# Patient Record
Sex: FEMALE | Race: WHITE | NOT HISPANIC OR LATINO | Employment: FULL TIME | URBAN - METROPOLITAN AREA
[De-identification: names, ages, dates, MRNs, and addresses within clinical notes are randomized per-mention and may not be internally consistent; named-entity substitution may affect disease eponyms.]

---

## 2022-01-17 ENCOUNTER — HOSPITAL ENCOUNTER (EMERGENCY)
Facility: MEDICAL CENTER | Age: 22
End: 2022-01-17
Attending: EMERGENCY MEDICINE
Payer: COMMERCIAL

## 2022-01-17 VITALS
TEMPERATURE: 98.7 F | WEIGHT: 147 LBS | RESPIRATION RATE: 18 BRPM | DIASTOLIC BLOOD PRESSURE: 58 MMHG | OXYGEN SATURATION: 98 % | HEIGHT: 68 IN | BODY MASS INDEX: 22.28 KG/M2 | HEART RATE: 96 BPM | SYSTOLIC BLOOD PRESSURE: 108 MMHG

## 2022-01-17 DIAGNOSIS — E86.0 DEHYDRATION: ICD-10-CM

## 2022-01-17 DIAGNOSIS — J10.1 INFLUENZA A: ICD-10-CM

## 2022-01-17 DIAGNOSIS — R11.2 NAUSEA AND VOMITING, INTRACTABILITY OF VOMITING NOT SPECIFIED, UNSPECIFIED VOMITING TYPE: ICD-10-CM

## 2022-01-17 LAB — S PYO DNA SPEC NAA+PROBE: NOT DETECTED

## 2022-01-17 PROCEDURE — 700105 HCHG RX REV CODE 258: Performed by: EMERGENCY MEDICINE

## 2022-01-17 PROCEDURE — 87651 STREP A DNA AMP PROBE: CPT

## 2022-01-17 PROCEDURE — 99284 EMERGENCY DEPT VISIT MOD MDM: CPT

## 2022-01-17 PROCEDURE — 96374 THER/PROPH/DIAG INJ IV PUSH: CPT

## 2022-01-17 PROCEDURE — 700102 HCHG RX REV CODE 250 W/ 637 OVERRIDE(OP): Performed by: EMERGENCY MEDICINE

## 2022-01-17 PROCEDURE — A9270 NON-COVERED ITEM OR SERVICE: HCPCS | Performed by: EMERGENCY MEDICINE

## 2022-01-17 PROCEDURE — 700111 HCHG RX REV CODE 636 W/ 250 OVERRIDE (IP): Performed by: EMERGENCY MEDICINE

## 2022-01-17 PROCEDURE — 96375 TX/PRO/DX INJ NEW DRUG ADDON: CPT

## 2022-01-17 RX ORDER — METOCLOPRAMIDE HYDROCHLORIDE 5 MG/ML
10 INJECTION INTRAMUSCULAR; INTRAVENOUS ONCE
Status: COMPLETED | OUTPATIENT
Start: 2022-01-17 | End: 2022-01-17

## 2022-01-17 RX ORDER — DIPHENHYDRAMINE HYDROCHLORIDE 50 MG/ML
25 INJECTION INTRAMUSCULAR; INTRAVENOUS ONCE
Status: COMPLETED | OUTPATIENT
Start: 2022-01-17 | End: 2022-01-17

## 2022-01-17 RX ORDER — ONDANSETRON 4 MG/1
4 TABLET, ORALLY DISINTEGRATING ORAL EVERY 6 HOURS PRN
Qty: 10 TABLET | Refills: 0 | Status: SHIPPED | OUTPATIENT
Start: 2022-01-17

## 2022-01-17 RX ORDER — ACETAMINOPHEN 325 MG/1
650 TABLET ORAL ONCE
Status: COMPLETED | OUTPATIENT
Start: 2022-01-17 | End: 2022-01-17

## 2022-01-17 RX ORDER — SODIUM CHLORIDE, SODIUM LACTATE, POTASSIUM CHLORIDE, CALCIUM CHLORIDE 600; 310; 30; 20 MG/100ML; MG/100ML; MG/100ML; MG/100ML
1000 INJECTION, SOLUTION INTRAVENOUS ONCE
Status: COMPLETED | OUTPATIENT
Start: 2022-01-17 | End: 2022-01-17

## 2022-01-17 RX ADMIN — DIPHENHYDRAMINE HYDROCHLORIDE 25 MG: 50 INJECTION INTRAMUSCULAR; INTRAVENOUS at 16:11

## 2022-01-17 RX ADMIN — METOCLOPRAMIDE 10 MG: 5 INJECTION, SOLUTION INTRAMUSCULAR; INTRAVENOUS at 16:11

## 2022-01-17 RX ADMIN — SODIUM CHLORIDE, POTASSIUM CHLORIDE, SODIUM LACTATE AND CALCIUM CHLORIDE 1000 ML: 600; 310; 30; 20 INJECTION, SOLUTION INTRAVENOUS at 16:16

## 2022-01-17 RX ADMIN — ACETAMINOPHEN 650 MG: 325 TABLET, FILM COATED ORAL at 16:11

## 2022-01-17 NOTE — ED NOTES
Pt brought back to [facility] from triage in . Pt able to transfer self to bed, in gown, on monitor, family at bedside, call light in reach. Chart up for ERP.

## 2022-01-17 NOTE — ED PROVIDER NOTES
ED Provider Note        Primary care provider: No primary care provider on file.    I verified that the patient was wearing a mask and I was wearing appropriate PPE every time I entered the room. The patient's mask was on the patient at all times during my encounter except for a brief view of the oropharynx.      CHIEF COMPLAINT  Chief Complaint   Patient presents with   • Body Aches     Symptoms started yesterday.    • N/V       HPI  Jodee Xavier is a 22 y.o. female who presents to the Emergency Department with chief complaint of nausea vomiting body aches.  Patient is approximately 5 weeks pregnant.  She has been feeling ill for the last couple days.  She reported that she went to an outside emergency department yesterday she was tested for COVID which was negative she tested positive for influenza A.  She states that she is continue to have intermittent fevers she has had some minor nausea and she has had some body aches.  She also reports sore throat.  She has had no diarrhea no abdominal pain no vaginal bleeding or discharge no urinary complaints.  She is had no change in her sense of taste or smell.  She has no other medical issues no other acute symptoms or concerns at this time.    REVIEW OF SYSTEMS  10 systems reviewed and otherwise negative, pertinent positives and negatives listed in the history of present illness.    PAST MEDICAL HISTORY   has a past medical history of Asthma.    SURGICAL HISTORY  patient denies any surgical history    SOCIAL HISTORY  Social History     Tobacco Use   • Smoking status: Never Smoker   • Smokeless tobacco: Never Used   Vaping Use   • Vaping Use: Never used   Substance Use Topics   • Alcohol use: Not Currently   • Drug use: Not Currently      Social History     Substance and Sexual Activity   Drug Use Not Currently       FAMILY HISTORY  Non-Contributory    CURRENT MEDICATIONS  Home Medications     Reviewed by Jessie Mcclure R.N. (Registered Nurse) on 01/17/22 at 1244   "Med List Status: Not Addressed   Medication Last Dose Status        Patient Burt Taking any Medications                       ALLERGIES  No Known Allergies    PHYSICAL EXAM  VITAL SIGNS: /58   Pulse (!) 111   Temp 37.6 °C (99.6 °F) (Temporal)   Resp 18   Ht 1.727 m (5' 8\")   Wt 66.7 kg (147 lb)   SpO2 97%   BMI 22.35 kg/m²   Pulse ox interpretation: I interpret this pulse ox as normal.  Constitutional: Alert and oriented x 3, moderate distress  HEENT: Atraumatic normocephalic, pupils are equal round, extraocular movements are intact. The nares is clear, external ears are normal, mouth shows dry mucous membranes moderate posterior pharyngeal erythema and edema no exudate minimal palpable cervical lymphadenopathy  Neck: no obvious JVD or tracheal deviation  Cardiovascular: Tachycardic no murmur rub or gallop   Thorax & Lungs: No respiratory distress, no wheezes rales or rhonchi, No chest tenderness.   GI: Soft nontender nondistended positive bowel sounds, no peritoneal signs  Skin: Warm dry no obvious acute rash or lesion  Musculoskeletal: Moving all extremities with normal range strength, no acute  deformity  Neurologic: Cranial nerves III through XII are grossly intact, no sensory deficit, no cerebellar dysfunction   Psychiatric: Appropriate affect for situation at this time      DIAGNOSTIC STUDIES / PROCEDURES  LABS      Results for orders placed or performed during the hospital encounter of 01/17/22   Group A Strep by PCR    Specimen: Throat   Result Value Ref Range    Group A Strep by PCR Not Detected Not Detected       All labs reviewed by me.        COURSE & MEDICAL DECISION MAKING  Pertinent Labs & Imaging studies reviewed. (See chart for details)    3:38 PM - Patient seen and examined at bedside.       Patient noted to have slightly elevated blood pressure likely circumstantial secondary to presenting complaint. Referred to primary care physician for further evaluation.      Medical Decision " "Making: Influenza positive at outside facility yesterday continues to be symptomatic she was slightly tachycardic at arrival.  She was afebrile having some minor nausea she was given Reglan Benadryl acetaminophen and a small fluid bolus she is had complete resolution of her symptoms feeling much better requesting discharge at this time.  Her vitals are now normal her exam is reassuring she understands to return for any fevers not responsive to antipyretics any shortness of breath chest pain any other acute symptoms or concerns she is otherwise discharged in stable and improved condition.    /58   Pulse 96   Temp 37.1 °C (98.7 °F) (Temporal)   Resp 18   Ht 1.727 m (5' 8\")   Wt 66.7 kg (147 lb)   SpO2 98%   BMI 22.35 kg/m²     [facility]  [address] [facility] [address]  [phone]  Schedule an appointment as soon as possible for a visit   for establishment of primary care, for blood pressure management    [facility], Emergency Dept  [address] [facility] [address] [phone]    in 12-24 hours if symptoms persist, immediately If symptoms worsen, or if you develop any other symptoms or concerns      Discharge Medication List as of 1/17/2022  6:09 PM      START taking these medications    Details   ondansetron (ZOFRAN ODT) 4 MG TABLET DISPERSIBLE Take 1 Tablet by mouth every 6 hours as needed., Disp-10 Tablet, R-0, Print Rx Paper             FINAL IMPRESSION  1. Influenza A Active   2. Nausea and vomiting, intractability of vomiting not specified, unspecified vomiting type Active   3. Dehydration Active          This dictation has been created using voice recognition software and/or scribes. The accuracy of the dictation is limited by the abilities of the software and the expertise of the scribes. I expect there may be some errors of grammar and possibly content. I made every attempt to manually correct the errors within my " dictation. However, errors related to voice recognition software and/or scribes may still exist and should be interpreted within the appropriate context.

## 2022-01-17 NOTE — ED TRIAGE NOTES
"Chief Complaint   Patient presents with   • Body Aches     Symptoms started yesterday.    • N/V     /75   Pulse (!) 125   Temp 37.6 °C (99.6 °F) (Temporal)   Resp 18   Ht 1.727 m (5' 8\")   Wt 66.7 kg (147 lb)   SpO2 97%   BMI 22.35 kg/m²     Pt ambulatd into triage, mask in place. Seen at [place] yesterday and diagnosed with the flu. Pt is 5 weeks pregnant, no cramping or bleeding. NAD, encouraged to return to the triage nurse or tech with any new complaints or symptoms.  "

## 2022-01-18 NOTE — ED NOTES
Pt ambulated to bathroom with stand-by assist. PIV established. Medicated per MAR. Throat swab collected and sent to lab.

## 2023-03-16 ENCOUNTER — HOSPITAL ENCOUNTER (EMERGENCY)
Facility: MEDICAL CENTER | Age: 23
End: 2023-03-16
Attending: EMERGENCY MEDICINE
Payer: COMMERCIAL

## 2023-03-16 VITALS
DIASTOLIC BLOOD PRESSURE: 69 MMHG | SYSTOLIC BLOOD PRESSURE: 120 MMHG | RESPIRATION RATE: 18 BRPM | HEART RATE: 99 BPM | BODY MASS INDEX: 24.67 KG/M2 | TEMPERATURE: 97.8 F | WEIGHT: 162.26 LBS | OXYGEN SATURATION: 98 %

## 2023-03-16 DIAGNOSIS — R10.2 PELVIC PAIN: ICD-10-CM

## 2023-03-16 LAB
ALBUMIN SERPL BCP-MCNC: 4.6 G/DL (ref 3.2–4.9)
ALBUMIN/GLOB SERPL: 1.4 G/DL
ALP SERPL-CCNC: 94 U/L (ref 30–99)
ALT SERPL-CCNC: 13 U/L (ref 2–50)
ANION GAP SERPL CALC-SCNC: 13 MMOL/L (ref 7–16)
APPEARANCE UR: CLEAR
AST SERPL-CCNC: 13 U/L (ref 12–45)
BASOPHILS # BLD AUTO: 0.6 % (ref 0–1.8)
BASOPHILS # BLD: 0.04 K/UL (ref 0–0.12)
BILIRUB SERPL-MCNC: 0.3 MG/DL (ref 0.1–1.5)
BILIRUB UR QL STRIP.AUTO: NEGATIVE
BUN SERPL-MCNC: 8 MG/DL (ref 8–22)
C TRACH DNA GENITAL QL NAA+PROBE: NEGATIVE
CALCIUM ALBUM COR SERPL-MCNC: 9.1 MG/DL (ref 8.5–10.5)
CALCIUM SERPL-MCNC: 9.6 MG/DL (ref 8.5–10.5)
CANDIDA DNA VAG QL PROBE+SIG AMP: NEGATIVE
CHLORIDE SERPL-SCNC: 104 MMOL/L (ref 96–112)
CO2 SERPL-SCNC: 22 MMOL/L (ref 20–33)
COLOR UR: YELLOW
CREAT SERPL-MCNC: 0.61 MG/DL (ref 0.5–1.4)
EOSINOPHIL # BLD AUTO: 0.11 K/UL (ref 0–0.51)
EOSINOPHIL NFR BLD: 1.5 % (ref 0–6.9)
ERYTHROCYTE [DISTWIDTH] IN BLOOD BY AUTOMATED COUNT: 38.6 FL (ref 35.9–50)
G VAGINALIS DNA VAG QL PROBE+SIG AMP: NEGATIVE
GFR SERPLBLD CREATININE-BSD FMLA CKD-EPI: 129 ML/MIN/1.73 M 2
GLOBULIN SER CALC-MCNC: 3.4 G/DL (ref 1.9–3.5)
GLUCOSE SERPL-MCNC: 110 MG/DL (ref 65–99)
GLUCOSE UR STRIP.AUTO-MCNC: NEGATIVE MG/DL
HCG SERPL QL: NEGATIVE
HCT VFR BLD AUTO: 43.9 % (ref 37–47)
HGB BLD-MCNC: 14.2 G/DL (ref 12–16)
IMM GRANULOCYTES # BLD AUTO: 0.04 K/UL (ref 0–0.11)
IMM GRANULOCYTES NFR BLD AUTO: 0.6 % (ref 0–0.9)
KETONES UR STRIP.AUTO-MCNC: ABNORMAL MG/DL
LEUKOCYTE ESTERASE UR QL STRIP.AUTO: NEGATIVE
LIPASE SERPL-CCNC: 23 U/L (ref 11–82)
LYMPHOCYTES # BLD AUTO: 1.89 K/UL (ref 1–4.8)
LYMPHOCYTES NFR BLD: 26.6 % (ref 22–41)
MCH RBC QN AUTO: 27.3 PG (ref 27–33)
MCHC RBC AUTO-ENTMCNC: 32.3 G/DL (ref 33.6–35)
MCV RBC AUTO: 84.3 FL (ref 81.4–97.8)
MICRO URNS: ABNORMAL
MONOCYTES # BLD AUTO: 0.43 K/UL (ref 0–0.85)
MONOCYTES NFR BLD AUTO: 6.1 % (ref 0–13.4)
N GONORRHOEA DNA GENITAL QL NAA+PROBE: NEGATIVE
NEUTROPHILS # BLD AUTO: 4.59 K/UL (ref 2–7.15)
NEUTROPHILS NFR BLD: 64.6 % (ref 44–72)
NITRITE UR QL STRIP.AUTO: NEGATIVE
NRBC # BLD AUTO: 0 K/UL
NRBC BLD-RTO: 0 /100 WBC
PH UR STRIP.AUTO: 6.5 [PH] (ref 5–8)
PLATELET # BLD AUTO: 369 K/UL (ref 164–446)
PMV BLD AUTO: 8.9 FL (ref 9–12.9)
POTASSIUM SERPL-SCNC: 4.3 MMOL/L (ref 3.6–5.5)
PROT SERPL-MCNC: 8 G/DL (ref 6–8.2)
PROT UR QL STRIP: NEGATIVE MG/DL
RBC # BLD AUTO: 5.21 M/UL (ref 4.2–5.4)
RBC UR QL AUTO: NEGATIVE
SODIUM SERPL-SCNC: 139 MMOL/L (ref 135–145)
SP GR UR STRIP.AUTO: 1.03
SPECIMEN SOURCE: NORMAL
T VAGINALIS DNA VAG QL PROBE+SIG AMP: NEGATIVE
UROBILINOGEN UR STRIP.AUTO-MCNC: 0.2 MG/DL
WBC # BLD AUTO: 7.1 K/UL (ref 4.8–10.8)

## 2023-03-16 PROCEDURE — 83690 ASSAY OF LIPASE: CPT

## 2023-03-16 PROCEDURE — 84703 CHORIONIC GONADOTROPIN ASSAY: CPT

## 2023-03-16 PROCEDURE — 99284 EMERGENCY DEPT VISIT MOD MDM: CPT

## 2023-03-16 PROCEDURE — 87660 TRICHOMONAS VAGIN DIR PROBE: CPT

## 2023-03-16 PROCEDURE — 87591 N.GONORRHOEAE DNA AMP PROB: CPT

## 2023-03-16 PROCEDURE — 36415 COLL VENOUS BLD VENIPUNCTURE: CPT

## 2023-03-16 PROCEDURE — 87491 CHLMYD TRACH DNA AMP PROBE: CPT

## 2023-03-16 PROCEDURE — 85025 COMPLETE CBC W/AUTO DIFF WBC: CPT

## 2023-03-16 PROCEDURE — 81003 URINALYSIS AUTO W/O SCOPE: CPT

## 2023-03-16 PROCEDURE — 80053 COMPREHEN METABOLIC PANEL: CPT

## 2023-03-16 PROCEDURE — 87480 CANDIDA DNA DIR PROBE: CPT

## 2023-03-16 PROCEDURE — 87510 GARDNER VAG DNA DIR PROBE: CPT

## 2023-03-16 NOTE — ED NOTES
ED tech located pelvic gurney for exam. Pt transferred over to pelvic gurney, pelvic cart at bedside.

## 2023-03-16 NOTE — ED TRIAGE NOTES
"Pt comes in reporting every month has vaginal discharge/tissue. \"I pass a piece of a tissue\" pt adding on abd cramping and nausea/HA. Pt with pictures of what she passes. Pt reporting [facility] did send the sample to pathology without no results yet   "

## 2023-03-16 NOTE — DISCHARGE INSTRUCTIONS
Please follow-up with your GYN as previously scheduled.  If you would like to see a GYN here in [place], you may contact the clinic listed above.  Please bring the pathology report from [facility] that we gave you today to your GYN appointment.  Return to the emergency department if you develop any new or worsening symptoms including worsening pain, vaginal discharge, worsening bleeding, fevers, or if you have any further concerns.

## 2023-03-16 NOTE — ED PROVIDER NOTES
Emergency Physician Note    Chief Complaint:  Chief Complaint   Patient presents with    Vaginal Discharge    Pelvic Pain                Limitation to History:  Select: : None    HPI/ROS   Outside Historians:   None     External Records Reviewed:   I was able to obtain the medical records from her emergency department visit at [facility] on 1/15/2023.  PA note reviewed from that visit.  She presented for evaluation of waxing and waning vaginal bleeding.  Noted that she had been evaluated 2 days prior for similar symptoms.  Labs were repeated that showed no change in hemoglobin.  Pelvic exam had been obtained 2 days prior.  She was discharged home in stable condition.  I was also able to obtain records from her visit on 1/1/2023, when she brought in a piece of tissue that she had passed.  Emergency physician note reviewed from that visit.  Pelvic ultrasound was obtained at that visit, that showed retroverted uterus.  Pelvic ultrasound was unremarkable.    HPI:  Jodee Xavier is a 23 y.o. female who presents to the emergency department today for evaluation of abnormal vaginal discharge.  Her symptoms initially began 3 months ago, she states that she passed an abnormal appearing piece of tissue.  She has a photo of it from that time, that is not identifiable on the cell phone picture.  She was seen at [facility] in [place], and states that the specimen was sent for pathology.  However she has not been able to receive a report.  Ever since that time, she reports that she has been passing similar pieces of tissue monthly, most recently 6 days ago.  She also states that she began to develop some nausea, and intermittent pelvic cramping 3 months ago when she passed the first piece of tissue.  She has taken home pregnancy tests which were negative.  She states that she had an ultrasound and pelvic exam done in January at [facility], however they were not able to find  any abnormalities, nor cause of her symptoms.  She has not been able to schedule a follow-up appointment with GYN, she does have an appointment scheduled for May, however her symptoms are persistent prompting her to come to [facility] for further evaluation.  She reports no associated fevers, she is on oral contraceptives, and states that she is having some breakthrough bleeding at this time which is unusual for her.  She is otherwise not having abnormal vaginal discharge today.  She does not know of any foreign body that could have been retained in the vagina.  She does not report any other significant past medical history, no medications, no anticoagulation or antiplatelet agent use at this time.      PAST MEDICAL HISTORY  Past Medical History:   Diagnosis Date    Asthma        SURGICAL HISTORY  History reviewed. No pertinent surgical history.    FAMILY HISTORY  None reported.     SOCIAL HISTORY   reports that she has never smoked. She has never used smokeless tobacco. She reports that she does not currently use alcohol. She reports that she does not currently use drugs.    CURRENT MEDICATIONS  Previous Medications    ONDANSETRON (ZOFRAN ODT) 4 MG TABLET DISPERSIBLE    Take 1 Tablet by mouth every 6 hours as needed.       ALLERGIES  Patient has no known allergies.    PHYSICAL EXAM  Vital Signs: /84   Pulse (!) 117   Temp 36.4 °C (97.5 °F) (Temporal)   Resp 18   Wt 73.6 kg (162 lb 4.1 oz)   SpO2 95%   BMI 24.67 kg/m²   Constitutional: Alert, no acute distress  HENT: Normocephalic, atraumatic.  Neck: Supple, normal range of motion, no stridor  Cardiovascular: Tachycardic rate  Pulmonary: No respiratory distress, normal work of breathing  Abdomen: Soft,  Nontender, nondistended, no peritoneal signs, bowel sounds are present.   : Scant white discharge present, consistent with physiologic.  Cervix is normal-appearing with scant blood from the cervical os consistent with reported  history of vaginal bleeding or breakthrough bleeding, no masses appreciated, no foreign body appreciated, no cervical motion tenderness  Skin: Warm, dry, no rashes or lesions  Back: No pain with active range of motion  Musculoskeletal: Normal range of motion in all extremities, no swelling or deformity noted  Neurologic: Alert, oriented, normal motor function, no speech deficits  Psychiatric: Normal and appropriate mood and affect      Diagnostic Studies & Procedures      Labs:  All labs reviewed by me as noted below.      Course and Medical Decision Making    ED Observation Status? Yes;  Differential diagnosis includes severe or life threatening conditions including Retained foreign body, abnormal mass.  Due to diagnostic uncertainty at this time, patient placed in observation status at 11:59 AM, 3/16/2023.    Observation plan is as follows: Attempt to obtain medical records and pathology report from [facility], lab work, imaging if necessary, pelvic exam    Upon Reevaluation, the patient's condition has: Remained stable with no worsening symptoms, no concerning findings on evaluation    Patient discharged from ED Observation status at 4:05 PM, 3/16/2023  Medical complexity: Moderate      Initial Assessment and Plan  Ms. Xavier presents to the emergency department today for ongoing intermittent vaginal bleeding, as well as some pelvic pain as documented above.  She is concerned because she had been passing some abnormal appearing tissue, most recently few days ago.  Pelvic exam today does not reveal any abnormal tissue, masses, nor foreign body.  I obtain screening lab work including CBC, CMP, and urinalysis which were reassuring.  I also obtained Candida, trichomonas, Gardnerella testing by PCR, as well as gonorrhea chlamydia testing by PCR, these test all resulted negative.    I was able to obtain the pathology report from [facility], though due to font, and poor copy quality, it is not entirely legible.  It  is listed as a biopsy specimen, final diagnosis lists inactive endometrial glands and decidualized stroma, consistent with hormone effect.  No chorionic villi present.  No hyperplasia, atypia, or carcinoma identified.    She has a follow-up appointment scheduled with GYN next month, she is counseled to keep that appointment, and bring a copy of the pathology report with her to that appointment.  I did provide her with a copy in the emergency department today. Return precautions were discussed with the patient, and provided in written form with the patient's discharge instructions.       Additional Problems and Disposition    Additional problems addressed:   Follow up results for pathology testing at [facility]-results were obtained in the emergency department today and provided to the patient    Escalation of care considered, and ultimately not performed: GYN consultation, and subsequent admission were considered, however on review of pathology testing, there is no indication for emergent consultation, admission, no urgent or emergent GYN procedure.    Disposition:  Discharged home in stable condition    FINAL IMPRESSION   1. Pelvic pain        The note accurately reflects work and decisions made by me.  Pati Mckeon M.D.  3/17/2023  9:48 AM

## 2025-06-02 ENCOUNTER — HOSPITAL ENCOUNTER (OUTPATIENT)
Facility: MEDICAL CENTER | Age: 25
End: 2025-06-03
Attending: STUDENT IN AN ORGANIZED HEALTH CARE EDUCATION/TRAINING PROGRAM | Admitting: STUDENT IN AN ORGANIZED HEALTH CARE EDUCATION/TRAINING PROGRAM
Payer: COMMERCIAL

## 2025-06-02 PROBLEM — R00.0 TACHYCARDIA: Status: ACTIVE | Noted: 2025-06-02

## 2025-06-02 PROCEDURE — 770020 HCHG ROOM/CARE - TELE (206)

## 2025-06-02 PROCEDURE — 93005 ELECTROCARDIOGRAM TRACING: CPT | Mod: TC

## 2025-06-02 RX ORDER — POLYETHYLENE GLYCOL 3350 17 G/17G
1 POWDER, FOR SOLUTION ORAL
Status: DISCONTINUED | OUTPATIENT
Start: 2025-06-02 | End: 2025-06-03 | Stop reason: HOSPADM

## 2025-06-02 RX ORDER — AMOXICILLIN 250 MG
2 CAPSULE ORAL EVERY EVENING
Status: DISCONTINUED | OUTPATIENT
Start: 2025-06-03 | End: 2025-06-03 | Stop reason: HOSPADM

## 2025-06-02 RX ORDER — ENOXAPARIN SODIUM 100 MG/ML
40 INJECTION SUBCUTANEOUS DAILY
Status: DISCONTINUED | OUTPATIENT
Start: 2025-06-03 | End: 2025-06-03 | Stop reason: HOSPADM

## 2025-06-02 SDOH — ECONOMIC STABILITY: TRANSPORTATION INSECURITY
IN THE PAST 12 MONTHS, HAS LACK OF RELIABLE TRANSPORTATION KEPT YOU FROM MEDICAL APPOINTMENTS, MEETINGS, WORK OR FROM GETTING THINGS NEEDED FOR DAILY LIVING?: NO

## 2025-06-02 SDOH — ECONOMIC STABILITY: TRANSPORTATION INSECURITY
IN THE PAST 12 MONTHS, HAS THE LACK OF TRANSPORTATION KEPT YOU FROM MEDICAL APPOINTMENTS OR FROM GETTING MEDICATIONS?: NO

## 2025-06-02 ASSESSMENT — COGNITIVE AND FUNCTIONAL STATUS - GENERAL
SUGGESTED CMS G CODE MODIFIER MOBILITY: CJ
DAILY ACTIVITIY SCORE: 23
SUGGESTED CMS G CODE MODIFIER DAILY ACTIVITY: CI
WALKING IN HOSPITAL ROOM: A LITTLE
MOVING TO AND FROM BED TO CHAIR: A LITTLE
MOBILITY SCORE: 22
TOILETING: A LITTLE

## 2025-06-02 ASSESSMENT — LIFESTYLE VARIABLES
EVER HAD A DRINK FIRST THING IN THE MORNING TO STEADY YOUR NERVES TO GET RID OF A HANGOVER: NO
HOW MANY TIMES IN THE PAST YEAR HAVE YOU HAD 5 OR MORE DRINKS IN A DAY: 0
AVERAGE NUMBER OF DAYS PER WEEK YOU HAVE A DRINK CONTAINING ALCOHOL: 0
TOTAL SCORE: 0
EVER FELT BAD OR GUILTY ABOUT YOUR DRINKING: NO
ON A TYPICAL DAY WHEN YOU DRINK ALCOHOL HOW MANY DRINKS DO YOU HAVE: 0
DOES PATIENT WANT TO STOP DRINKING: NO
CONSUMPTION TOTAL: NEGATIVE
TOTAL SCORE: 0
HAVE PEOPLE ANNOYED YOU BY CRITICIZING YOUR DRINKING: NO
HAVE YOU EVER FELT YOU SHOULD CUT DOWN ON YOUR DRINKING: NO
ALCOHOL_USE: NO
TOTAL SCORE: 0

## 2025-06-02 ASSESSMENT — SOCIAL DETERMINANTS OF HEALTH (SDOH)
WITHIN THE PAST 12 MONTHS, THE FOOD YOU BOUGHT JUST DIDN'T LAST AND YOU DIDN'T HAVE MONEY TO GET MORE: NEVER TRUE
WITHIN THE LAST YEAR, HAVE YOU BEEN AFRAID OF YOUR PARTNER OR EX-PARTNER?: NO
WITHIN THE LAST YEAR, HAVE TO BEEN RAPED OR FORCED TO HAVE ANY KIND OF SEXUAL ACTIVITY BY YOUR PARTNER OR EX-PARTNER?: NO
WITHIN THE PAST 12 MONTHS, YOU WORRIED THAT YOUR FOOD WOULD RUN OUT BEFORE YOU GOT THE MONEY TO BUY MORE: NEVER TRUE
IN THE PAST 12 MONTHS, HAS THE ELECTRIC, GAS, OIL, OR WATER COMPANY THREATENED TO SHUT OFF SERVICE IN YOUR HOME?: NO
WITHIN THE LAST YEAR, HAVE YOU BEEN KICKED, HIT, SLAPPED, OR OTHERWISE PHYSICALLY HURT BY YOUR PARTNER OR EX-PARTNER?: NO
WITHIN THE LAST YEAR, HAVE YOU BEEN HUMILIATED OR EMOTIONALLY ABUSED IN OTHER WAYS BY YOUR PARTNER OR EX-PARTNER?: NO

## 2025-06-02 ASSESSMENT — PATIENT HEALTH QUESTIONNAIRE - PHQ9
2. FEELING DOWN, DEPRESSED, IRRITABLE, OR HOPELESS: NOT AT ALL
1. LITTLE INTEREST OR PLEASURE IN DOING THINGS: NOT AT ALL
SUM OF ALL RESPONSES TO PHQ9 QUESTIONS 1 AND 2: 0

## 2025-06-03 ENCOUNTER — APPOINTMENT (OUTPATIENT)
Dept: CARDIOLOGY | Facility: MEDICAL CENTER | Age: 25
End: 2025-06-03
Payer: COMMERCIAL

## 2025-06-03 VITALS
SYSTOLIC BLOOD PRESSURE: 99 MMHG | OXYGEN SATURATION: 97 % | BODY MASS INDEX: 24.17 KG/M2 | HEART RATE: 66 BPM | WEIGHT: 158.95 LBS | DIASTOLIC BLOOD PRESSURE: 60 MMHG | TEMPERATURE: 97.7 F | RESPIRATION RATE: 17 BRPM

## 2025-06-03 PROBLEM — F31.9 BIPOLAR DISORDER (HCC): Status: ACTIVE | Noted: 2025-06-03

## 2025-06-03 PROBLEM — R11.0 NAUSEA: Status: ACTIVE | Noted: 2025-06-03

## 2025-06-03 PROBLEM — F32.A DEPRESSION: Status: ACTIVE | Noted: 2025-06-03

## 2025-06-03 PROBLEM — G47.00 INSOMNIA: Status: ACTIVE | Noted: 2025-06-03

## 2025-06-03 LAB
ALBUMIN SERPL BCP-MCNC: 4.2 G/DL (ref 3.2–4.9)
ALBUMIN/GLOB SERPL: 1.7 G/DL
ALP SERPL-CCNC: 78 U/L (ref 30–99)
ALT SERPL-CCNC: 10 U/L (ref 2–50)
AMPHET UR QL SCN: POSITIVE
ANION GAP SERPL CALC-SCNC: 8 MMOL/L (ref 7–16)
AST SERPL-CCNC: 16 U/L (ref 12–45)
BARBITURATES UR QL SCN: NEGATIVE
BASOPHILS # BLD AUTO: 0.7 % (ref 0–1.8)
BASOPHILS # BLD: 0.06 K/UL (ref 0–0.12)
BENZODIAZ UR QL SCN: NEGATIVE
BILIRUB SERPL-MCNC: 0.6 MG/DL (ref 0.1–1.5)
BUN SERPL-MCNC: 8 MG/DL (ref 8–22)
BZE UR QL SCN: NEGATIVE
CALCIUM ALBUM COR SERPL-MCNC: 8.6 MG/DL (ref 8.5–10.5)
CALCIUM SERPL-MCNC: 8.8 MG/DL (ref 8.5–10.5)
CANNABINOIDS UR QL SCN: NEGATIVE
CHLORIDE SERPL-SCNC: 105 MMOL/L (ref 96–112)
CHOLEST SERPL-MCNC: 117 MG/DL (ref 100–199)
CO2 SERPL-SCNC: 24 MMOL/L (ref 20–33)
CREAT SERPL-MCNC: 0.65 MG/DL (ref 0.5–1.4)
D DIMER PPP IA.FEU-MCNC: <0.27 UG/ML (FEU) (ref 0–0.5)
EKG IMPRESSION: NORMAL
EOSINOPHIL # BLD AUTO: 0.06 K/UL (ref 0–0.51)
EOSINOPHIL NFR BLD: 0.7 % (ref 0–6.9)
ERYTHROCYTE [DISTWIDTH] IN BLOOD BY AUTOMATED COUNT: 38.5 FL (ref 35.9–50)
FENTANYL UR QL: NEGATIVE
GFR SERPLBLD CREATININE-BSD FMLA CKD-EPI: 125 ML/MIN/1.73 M 2
GLOBULIN SER CALC-MCNC: 2.5 G/DL (ref 1.9–3.5)
GLUCOSE SERPL-MCNC: 99 MG/DL (ref 65–99)
HCT VFR BLD AUTO: 37.5 % (ref 37–47)
HDLC SERPL-MCNC: 55 MG/DL
HGB BLD-MCNC: 12.9 G/DL (ref 12–16)
IMM GRANULOCYTES # BLD AUTO: 0.03 K/UL (ref 0–0.11)
IMM GRANULOCYTES NFR BLD AUTO: 0.3 % (ref 0–0.9)
LDLC SERPL CALC-MCNC: 56 MG/DL
LV EJECT FRACT  99904: 60
LV EJECT FRACT MOD 2C 99903: 51.68
LV EJECT FRACT MOD 4C 99902: 65.81
LV EJECT FRACT MOD BP 99901: 58.95
LYMPHOCYTES # BLD AUTO: 2.41 K/UL (ref 1–4.8)
LYMPHOCYTES NFR BLD: 27.7 % (ref 22–41)
MAGNESIUM SERPL-MCNC: 1.9 MG/DL (ref 1.5–2.5)
MCH RBC QN AUTO: 28.5 PG (ref 27–33)
MCHC RBC AUTO-ENTMCNC: 34.4 G/DL (ref 32.2–35.5)
MCV RBC AUTO: 83 FL (ref 81.4–97.8)
METHADONE UR QL SCN: NEGATIVE
MONOCYTES # BLD AUTO: 0.57 K/UL (ref 0–0.85)
MONOCYTES NFR BLD AUTO: 6.5 % (ref 0–13.4)
NEUTROPHILS # BLD AUTO: 5.58 K/UL (ref 1.82–7.42)
NEUTROPHILS NFR BLD: 64.1 % (ref 44–72)
NRBC # BLD AUTO: 0 K/UL
NRBC BLD-RTO: 0 /100 WBC (ref 0–0.2)
OPIATES UR QL SCN: NEGATIVE
OXYCODONE UR QL SCN: NEGATIVE
PCP UR QL SCN: NEGATIVE
PLATELET # BLD AUTO: 297 K/UL (ref 164–446)
PMV BLD AUTO: 9.5 FL (ref 9–12.9)
POTASSIUM SERPL-SCNC: 3.5 MMOL/L (ref 3.6–5.5)
PROPOXYPH UR QL SCN: NEGATIVE
PROT SERPL-MCNC: 6.7 G/DL (ref 6–8.2)
RBC # BLD AUTO: 4.52 M/UL (ref 4.2–5.4)
SODIUM SERPL-SCNC: 137 MMOL/L (ref 135–145)
TRIGL SERPL-MCNC: 30 MG/DL (ref 0–149)
TROPONIN T SERPL-MCNC: <6 NG/L (ref 6–19)
TSH SERPL DL<=0.005 MIU/L-ACNC: 3.23 UIU/ML (ref 0.38–5.33)
WBC # BLD AUTO: 8.7 K/UL (ref 4.8–10.8)

## 2025-06-03 PROCEDURE — 93306 TTE W/DOPPLER COMPLETE: CPT | Mod: 26 | Performed by: INTERNAL MEDICINE

## 2025-06-03 PROCEDURE — 85379 FIBRIN DEGRADATION QUANT: CPT

## 2025-06-03 PROCEDURE — 93306 TTE W/DOPPLER COMPLETE: CPT

## 2025-06-03 PROCEDURE — A9270 NON-COVERED ITEM OR SERVICE: HCPCS | Performed by: STUDENT IN AN ORGANIZED HEALTH CARE EDUCATION/TRAINING PROGRAM

## 2025-06-03 PROCEDURE — 85025 COMPLETE CBC W/AUTO DIFF WBC: CPT

## 2025-06-03 PROCEDURE — 84443 ASSAY THYROID STIM HORMONE: CPT

## 2025-06-03 PROCEDURE — A9270 NON-COVERED ITEM OR SERVICE: HCPCS

## 2025-06-03 PROCEDURE — 80061 LIPID PANEL: CPT

## 2025-06-03 PROCEDURE — 36415 COLL VENOUS BLD VENIPUNCTURE: CPT

## 2025-06-03 PROCEDURE — 700111 HCHG RX REV CODE 636 W/ 250 OVERRIDE (IP): Mod: JZ

## 2025-06-03 PROCEDURE — 99223 1ST HOSP IP/OBS HIGH 75: CPT | Mod: GC | Performed by: HOSPITALIST

## 2025-06-03 PROCEDURE — 700102 HCHG RX REV CODE 250 W/ 637 OVERRIDE(OP): Performed by: STUDENT IN AN ORGANIZED HEALTH CARE EDUCATION/TRAINING PROGRAM

## 2025-06-03 PROCEDURE — 83735 ASSAY OF MAGNESIUM: CPT

## 2025-06-03 PROCEDURE — 80307 DRUG TEST PRSMV CHEM ANLYZR: CPT

## 2025-06-03 PROCEDURE — 99239 HOSP IP/OBS DSCHRG MGMT >30: CPT | Performed by: STUDENT IN AN ORGANIZED HEALTH CARE EDUCATION/TRAINING PROGRAM

## 2025-06-03 PROCEDURE — 700102 HCHG RX REV CODE 250 W/ 637 OVERRIDE(OP)

## 2025-06-03 PROCEDURE — 84484 ASSAY OF TROPONIN QUANT: CPT

## 2025-06-03 PROCEDURE — 93010 ELECTROCARDIOGRAM REPORT: CPT | Performed by: INTERNAL MEDICINE

## 2025-06-03 PROCEDURE — G0378 HOSPITAL OBSERVATION PER HR: HCPCS

## 2025-06-03 PROCEDURE — 80053 COMPREHEN METABOLIC PANEL: CPT

## 2025-06-03 RX ORDER — TRAZODONE HYDROCHLORIDE 50 MG/1
50 TABLET ORAL NIGHTLY
COMMUNITY

## 2025-06-03 RX ORDER — ALBUTEROL SULFATE 90 UG/1
2 INHALANT RESPIRATORY (INHALATION) EVERY 6 HOURS PRN
COMMUNITY

## 2025-06-03 RX ORDER — POTASSIUM CHLORIDE 1500 MG/1
40 TABLET, EXTENDED RELEASE ORAL ONCE
Status: COMPLETED | OUTPATIENT
Start: 2025-06-03 | End: 2025-06-03

## 2025-06-03 RX ORDER — LURASIDONE HYDROCHLORIDE 40 MG/1
40 TABLET, FILM COATED ORAL
COMMUNITY

## 2025-06-03 RX ORDER — LURASIDONE HYDROCHLORIDE 40 MG/1
40 TABLET, FILM COATED ORAL
Status: DISCONTINUED | OUTPATIENT
Start: 2025-06-03 | End: 2025-06-03 | Stop reason: HOSPADM

## 2025-06-03 RX ORDER — METOPROLOL SUCCINATE 50 MG/1
50 TABLET, EXTENDED RELEASE ORAL 2 TIMES DAILY
COMMUNITY

## 2025-06-03 RX ORDER — TRAZODONE HYDROCHLORIDE 50 MG/1
50 TABLET ORAL
Status: DISCONTINUED | OUTPATIENT
Start: 2025-06-03 | End: 2025-06-03 | Stop reason: HOSPADM

## 2025-06-03 RX ORDER — METOPROLOL SUCCINATE 50 MG/1
50 TABLET, EXTENDED RELEASE ORAL 2 TIMES DAILY
Status: DISCONTINUED | OUTPATIENT
Start: 2025-06-03 | End: 2025-06-03 | Stop reason: HOSPADM

## 2025-06-03 RX ORDER — ACETAMINOPHEN 325 MG/1
650 TABLET ORAL EVERY 4 HOURS PRN
Status: DISCONTINUED | OUTPATIENT
Start: 2025-06-03 | End: 2025-06-03 | Stop reason: HOSPADM

## 2025-06-03 RX ORDER — ONDANSETRON 2 MG/ML
4 INJECTION INTRAMUSCULAR; INTRAVENOUS EVERY 4 HOURS PRN
Status: DISCONTINUED | OUTPATIENT
Start: 2025-06-03 | End: 2025-06-03 | Stop reason: HOSPADM

## 2025-06-03 RX ADMIN — POTASSIUM CHLORIDE 40 MEQ: 1500 TABLET, EXTENDED RELEASE ORAL at 08:54

## 2025-06-03 RX ADMIN — ONDANSETRON 4 MG: 2 INJECTION INTRAMUSCULAR; INTRAVENOUS at 06:22

## 2025-06-03 RX ADMIN — ENOXAPARIN SODIUM 40 MG: 100 INJECTION SUBCUTANEOUS at 00:38

## 2025-06-03 RX ADMIN — POTASSIUM CHLORIDE 40 MEQ: 1500 TABLET, EXTENDED RELEASE ORAL at 06:22

## 2025-06-03 RX ADMIN — ONDANSETRON 4 MG: 2 INJECTION INTRAMUSCULAR; INTRAVENOUS at 01:08

## 2025-06-03 RX ADMIN — METOPROLOL SUCCINATE 50 MG: 50 TABLET, EXTENDED RELEASE ORAL at 01:52

## 2025-06-03 RX ADMIN — ACETAMINOPHEN 650 MG: 325 TABLET ORAL at 06:21

## 2025-06-03 RX ADMIN — TRAZODONE HYDROCHLORIDE 50 MG: 50 TABLET ORAL at 01:53

## 2025-06-03 ASSESSMENT — ENCOUNTER SYMPTOMS
DEPRESSION: 1
COUGH: 0
PALPITATIONS: 1
LOSS OF CONSCIOUSNESS: 0
CHILLS: 0
INSOMNIA: 1
SHORTNESS OF BREATH: 1
DIZZINESS: 1
NAUSEA: 1
WHEEZING: 0
DIARRHEA: 0
FALLS: 0
FEVER: 0

## 2025-06-03 ASSESSMENT — PAIN DESCRIPTION - PAIN TYPE: TYPE: ACUTE PAIN

## 2025-06-03 ASSESSMENT — LIFESTYLE VARIABLES: SUBSTANCE_ABUSE: 0

## 2025-06-03 NOTE — PROGRESS NOTES
BRIEF ADMISSION REPORT:    This patient is being admitted to the Hospitalist Service.    Report received from ERP : CHICHI Echols    Reason for Admission:persistent tachycardia; concerns for SVT    Pertinent history and findings: Patient is a 25-year-old male female, with known history of tachycardia.  She is experiencing periodic episodes of persistent tachycardia.  She is followed by renal medicine clinic, but has not been seen by a cardiologist.  They started her on metoprolol, however her rates have been noted to be in the 100s to 160s.  She is currently symptomatic, with sensations of lightheadedness  Vitals Signs reviewed and patient is stable:NSR; HR: 80's   Consults Called:none  Hospitalist Assigned to Admit this Patient:rtoc  CODE Status:full       For any questions or concerns about this patient, please page Hospitalist Service.

## 2025-06-03 NOTE — DISCHARGE PLANNING
Patient rolled back to observation/outpatient status per attending MD determination, Destiny Frye, and UR committee IPA secondary reviewer, Boyd Brown. Patient status update completed. Voalte message sent to assigned CM to have form 2 delivered.

## 2025-06-03 NOTE — DISCHARGE INSTRUCTIONS
Continue metoprolol 50 mg twice daily, follow up with your cardiologist as scheduled as you may need adjustment in medications   Your echocardiogram is normal, your thyroid and electrolytes are stable. Heart heart rate and rhythm have been stable while here being monitored.   Recommend good hydration with water, compression stockings may help limit symptoms of dizziness/elevated heart rate when standing. Change positions carefully and slowly. Eating a high salt diet can also be helpful to reduce symptoms.

## 2025-06-03 NOTE — ASSESSMENT & PLAN NOTE
Continue home Latuda  
Continue home trazodone  
Denies recent sick contacts, negative pregnancy test at outside facility.  Unclear etiology    - As needed Zofran  - If ongoing/with vomiting consider GI PCR panel  
Outpatient records with concern for symptomatic SVT, treated with metoprolol 50 mg twice daily and as needed self treatment such as placing face in ice water.  Patient unfortunately experiencing uncontrolled tachycardia symptoms with associated chest pain and shortness of breath, improved with vagal maneuvers.  Heart rate generally well controlled less than 100, however outside facility documented rate spiked to 160.  Serial EKG with sinus tachycardia/sinus rhythm.  Negative troponin at outside facility, no evidence of infection, no evidence of infarction, no substance use, no anemia, not dehydrated.  Does report grandmother  of presumed sudden cardiac death.  Unclear etiology of the tachycardia, possibly inappropriate sinus tach versus SVT    - Continue home metoprolol 50 mg twice daily  - Echocardiogram ordered  - Telemetry monitoring  - Trend troponin, check D-dimer, UDS  - Thyroid studies  - Ensure electrolyte stability  -- If no events on telemetry may need repeat event monitor   
no pleuritic chest pain/no cough/no wheezing/no dyspnea/no hemoptysis

## 2025-06-03 NOTE — H&P
"[facility] Internal Medicine History & Physical Note    Date of Service  6/3/2025      Attending: Mo Murphy M.d.  Senior Resident: Dr. Gale  Contact Number: [phone]    Primary Care Physician  Pcp Pt States None    Consultants  None    Code Status  Full Code    Chief Complaint  No chief complaint on file.      History of Presenting Illness (HPI): Jodee Xavier is a 25 y.o. female with a past medical history of depression, insomnia, tachycardia who presented 2025 as a transfer from [facility] for further workup of tachycardia.  Patient reports about a year ago she started having symptoms of racing heart with associated lightheadedness.  Over the last 2 days it worsened and was not resolved with putting her face in ice water as it normally temporarily does.  She takes metoprolol 50 mg twice daily which she is adherent with and does not miss doses; this was prescribed by a known cardiologist.  She has never seen a cardiologist for her tachycardia, though she will be establishing with one in July.  She has been on amiodarone in the past, it seems that this is a possible primary care provider but is unclear.  She had a Zio patch which showed \"tachycardia\" and reports had an echocardiogram in [place] about a year ago and reports no known abnormalities.  She gets shortness of breath and chest pain when her heart rate increases and it improves when her heart rate comes down.  Vagal maneuvers do temporarily improve her heart rate.  She does not use supplements, caffeine, substances, does not smoke, no recent illness.  Reports her mom has some unknown heart disease and that her grandma  suddenly which was presumed to be cardiac etiology.    [facility] heart rate was 102 on arrival, no anemia or leukocytosis on labs, mild metabolic acidosis bicarb 22.  EKG showed sinus tachycardia with ventricular rate 100 and no acute ischemic changes, troponin negative, normal electrolytes, negative pregnancy screen.  She did " have a tachycardic episode with rates up to 160 with associated lightheaded and dizzy symptoms but vagal maneuvers brought her rate back down to the mid 80s.  Chest x-ray negative for abnormalities, COVID/flu/RSV swab negative.  She was given aspirin 324, 1 L fluid bolus and request for transfer here was done for echocardiogram.    I discussed the plan of care with patient, bedside RN, and attending.    Review of Systems  Review of Systems   Constitutional:  Negative for chills and fever.   Respiratory:  Positive for shortness of breath. Negative for cough and wheezing.    Cardiovascular:  Positive for chest pain and palpitations.   Gastrointestinal:  Positive for nausea. Negative for diarrhea.   Musculoskeletal:  Negative for falls.   Neurological:  Positive for dizziness. Negative for loss of consciousness.   Psychiatric/Behavioral:  Positive for depression. Negative for substance abuse. The patient has insomnia.        Past Medical History  Depression, insomnia, asthma, anxiety, bipolar disorder.    Surgical History  Laparoscopy    Family History  Heart disease mom, grandmother     Social History  Tobacco: None  Alcohol: None  Recreational drugs (illegal or prescription): None  Employment: [employer] student  Living Situation: [facility]  Recent Travel: None  Primary Care Provider: Not Reviewed  Other (stressors, spirituality, exposures): None    Allergies  Allergies[1]    Medications  Prior to Admission Medications   Prescriptions Last Dose Informant Patient Reported? Taking?   ondansetron (ZOFRAN ODT) 4 MG TABLET DISPERSIBLE   No No   Sig: Take 1 Tablet by mouth every 6 hours as needed.      Facility-Administered Medications: None       Physical Exam  Temp:  [36.5 °C (97.7 °F)] 36.5 °C (97.7 °F)  Pulse:  [] 88  Resp:  [18] 18  BP: (117-124)/(72-81) 124/72  SpO2:  [98 %-100 %] 98 %  Blood Pressure: 124/72   Temperature: 36.5 °C (97.7 °F)   Pulse: 88   Respiration: 18   Pulse Oximetry: 98 %  "      Physical Exam  Vitals reviewed.   Constitutional:       General: She is not in acute distress.     Appearance: Normal appearance. She is not ill-appearing, toxic-appearing or diaphoretic.      Comments: Mildly uncomfortable.   HENT:      Head: Normocephalic and atraumatic.   Eyes:      General: No scleral icterus.     Extraocular Movements: Extraocular movements intact.   Cardiovascular:      Rate and Rhythm: Normal rate and regular rhythm.      Heart sounds: No murmur heard.  Pulmonary:      Effort: Pulmonary effort is normal. No respiratory distress.      Breath sounds: Normal breath sounds. No wheezing or rales.   Abdominal:      General: Abdomen is flat. Bowel sounds are normal.      Palpations: Abdomen is soft.   Musculoskeletal:      Right lower leg: No edema.      Left lower leg: No edema.   Skin:     General: Skin is warm and dry.      Coloration: Skin is not jaundiced or pale.   Neurological:      General: No focal deficit present.      Mental Status: She is alert and oriented to person, place, and time.   Psychiatric:         Mood and Affect: Mood normal.         Behavior: Behavior normal.         Thought Content: Thought content normal.         Judgment: Judgment normal.         Laboratory:          No results for input(s): \"ALTSGPT\", \"ASTSGOT\", \"ALKPHOSPHAT\", \"TBILIRUBIN\", \"DBILIRUBIN\", \"GAMMAGT\", \"AMYLASE\", \"LIPASE\", \"ALB\", \"PREALBUMIN\", \"GLUCOSE\" in the last 72 hours.      No results for input(s): \"NTPROBNP\" in the last 72 hours.      No results for input(s): \"TROPONINT\" in the last 72 hours.    Imaging:  EC-ECHOCARDIOGRAM COMPLETE W/O CONT    (Results Pending)       Results for orders placed or performed during the hospital encounter of 06/02/25   EKG   Result Value Ref Range    Report       [facility]    Test Date:  2025-06-03  Pt Name:    RUSS BLAKELY                 Department: 171  MRN:        [id number]                      Room:       T715  Gender:     Female                       " Technician: CHIQUITA  :        [id number]                   Requested By:LO RODRIGUEZ  Order #:    [id number]                    [place] MD:    Measurements  Intervals                                Axis  Rate:       90                           P:          59  OH:         160                          QRS:        67  QRSD:       92                           T:          18  QT:         361  QTc:        442    Interpretive Statements  Sinus rhythm  Borderline Q waves in lateral leads  No previous ECG available for comparison          Assessment/Plan:    I anticipate this patient will require at least two midnights for appropriate medical management, necessitating inpatient admission because needs echo, possible cardiac workup    Patient will need a Telemetry bed on MEDICAL service .  The need is secondary to tachycardia .    Tachycardia  Outpatient records with concern for symptomatic SVT, treated with metoprolol 50 mg twice daily and as needed self treatment such as placing face in ice water.  Patient unfortunately experiencing uncontrolled tachycardia symptoms with associated chest pain and shortness of breath, improved with vagal maneuvers.  Heart rate generally well controlled less than 100, however outside facility documented rate spiked to 160.  Serial EKG with sinus tachycardia/sinus rhythm.  Negative troponin at outside facility, no evidence of infection, no evidence of infarction, no substance use, no anemia, not dehydrated.  Does report grandmother  of presumed sudden cardiac death.  Unclear etiology of the tachycardia, possibly inappropriate sinus tach versus SVT    - Continue home metoprolol 50 mg twice daily  - Echocardiogram ordered  - Telemetry monitoring  - Trend troponin, check D-dimer, UDS  - Thyroid studies  - Ensure electrolyte stability  -- If no events on telemetry may need repeat event monitor     Bipolar disorder (HCC)  Continue home Latuda    Insomnia  Continue home  trazodone    Nausea  Denies recent sick contacts, negative pregnancy test at outside facility.  Unclear etiology    - As needed Zofran  - If ongoing/with vomiting consider GI PCR panel       VTE prophylaxis: SCDs/TEDs and enoxaparin ppx         [1] No Known Allergies

## 2025-06-03 NOTE — DISCHARGE PLANNING
Provided Observation Outpatient Information for Our Patients notice.   Pt has orders for discharge. No needs identified. Pt's father will provide transport home.

## 2025-06-03 NOTE — CARE PLAN
The patient is Stable - Low risk of patient condition declining or worsening    Shift Goals  Clinical Goals: Tele monitoring, labs, chest pain control, Vitals  Patient Goals: Pain control, information  Family Goals: Info    Progress made toward(s) clinical / shift goals:  yes    Patient is not progressing towards the following goals:

## 2025-06-03 NOTE — PROGRESS NOTES
Bedside report received, assumed care of patient at change of shift. Chart, labs, and orders reviewed. Pt resting in bed, breathing even and unlabored, complains of pain, declines intervention and in no acute distress. A&O x4. Tele monitoring in place. Fall precautions including bed alarm in place and education provided. Call light within reach, bed locked and in lowest position, denies other needs at this time.

## 2025-06-03 NOTE — PROGRESS NOTES
4 Eyes Skin Assessment Completed by Sloan Faye RN and Karine Dumont RN.    Skin assessment is primarily focused on high risk bony prominences. Pay special attention to skin beneath and around medical devices, high risk bony prominences, skin to skin areas and areas where the patient lacks sensation to feel pain and areas where the patient previously had breakdown.     Head (Occipital):  WDL   Ears (Under Medical Devices): WDL   Nose (Under Medical Devices): WDL   Mouth:  WDL   Neck: WDL   Breast/Chest:  WDL   Shoulder Blades:  WDL   Spine:   WDL   (R) Arm/Elbow/Hand: Red and Blanching   (L) Arm/Elbow/Hand: Red and Blanching   Abdomen: WDL   Pannus/Groin:  WDL   Sacrum/Coccyx:   WDL   (R) Ischial Tuberosity (Sit Bones):  WDL   (L) Ischial Tuberosity (Sit Bones):  WDL   (R) Leg:  WDL   (L) Leg:  WDL   (R) Heel:  WDL   (R) Foot/Toe: WDL   (L) Heel: WDL   (L) Foot/Toe:  WDL       DEVICES IN USE:   Respiratory Devices:  NA, patient on room air  Feeding Devices:  N/A   Lines & BP Monitoring Devices:  Peripheral IV, BP cuff, and Pulse ox    Orthopedic Devices:  N/A  Miscellaneous Devices:  N/A    PROTOCOL INTERVENTIONS:   Standard/Trauma Bed:  Already in place    WOUND PHOTOS:   N/A no wounds identified    WOUND CONSULT:   N/A, no advanced wound care needs identified

## 2025-06-03 NOTE — DISCHARGE SUMMARY
"Discharge Summary    CHIEF COMPLAINT ON ADMISSION  No chief complaint on file.      Reason for Admission  Cardiology (Inappropriate sinus ta*     Admission Date  2025    CODE STATUS  Full Code    HPI & HOSPITAL COURSE   Jodee Xavier is a pleasant 25 y.o. female with a past medical history of depression, insomnia, tachycardia who presented 2025 as a transfer from [facility] for further workup of tachycardia.  Patient reports about a year ago she started having symptoms of racing heart with associated lightheadedness.  Over the last 2 days it worsened and was not resolved with putting her face in ice water as it normally temporarily does.  She takes metoprolol 50 mg twice daily which she is adherent with and does not miss doses. She has never seen a cardiologist for her tachycardia, though she will be establishing with one in July.  She has been on amiodarone in the past, it seems that this is a possible primary care provider but is unclear.  She had a Zio patch which showed \"tachycardia\" and reports had an echocardiogram in [facility] about a year ago and reports no known abnormalities.  She gets shortness of breath and chest pain when her heart rate increases and it improves when her heart rate comes down.  Vagal maneuvers do temporarily improve her heart rate.  She does not use supplements, caffeine, substances, does not smoke, no recent illness.  Reports her mom has some unknown heart disease and that her grandma  suddenly which was presumed to be of cardiac etiology.     She presented to [facility] heart [facility] and was noted to have a heart rate of 102 on arrival, no anemia or leukocytosis on labs, mild metabolic acidosis bicarb 22.  EKG showed sinus tachycardia with ventricular rate 100 and no acute ischemic changes, troponin negative, normal electrolytes, negative pregnancy screen.  She did have a tachycardic episode with rates up to 160 with associated lightheaded and dizzy symptoms but " vagal maneuvers brought her rate back down to the mid 80s.  Chest x-ray negative for abnormalities, COVID/flu/RSV swab negative.  She was given aspirin 324, 1 L fluid bolus and request for transfer here was done for echocardiogram.  Suspected diagnosis of postural orthostatic tachycardia.  She was evaluated on telemetry with normal sinus rhythm.  Echocardiogram was unremarkable with normal wall motion no valvular pathology and a normal ejection fraction.  Patient was continued on her metoprolol.  She continues to get some spikes of elevated heart rate with activity however none of these were significant during her admission.  She does have scheduled follow-up with a cardiologist I recommend that she keep this appointment and continue on her metoprolol.  Of note her urine drug screen was positive for amphetamines.  Patient was extremely surprised by this she did not absolutely denies any drug use.  Patient does take trazodone which can cause false positive drug screens.        Therefore, she is discharged in fair and stable condition to home with close outpatient follow-up.    The patient met  criteria for an observation stay at the time of discharge.    Discharge Date  6/3/2025    FOLLOW UP ITEMS POST DISCHARGE  Follow-up with cardiology for suspected POTS    DISCHARGE DIAGNOSES  Principal Problem:    Tachycardia (POA: Yes)  Active Problems:    Bipolar disorder (HCC) (POA: Unknown)    Insomnia (POA: Unknown)    Nausea (POA: Unknown)  Resolved Problems:    * No resolved hospital problems. *      FOLLOW UP  No future appointments.  No follow-up provider specified.    MEDICATIONS ON DISCHARGE     Medication List        CONTINUE taking these medications        Instructions   albuterol 108 (90 Base) MCG/ACT Aers inhalation aerosol   Inhale 2 Puffs every 6 hours as needed for Shortness of Breath.  Dose: 2 Puff     lurasidone 40 MG Tabs  Commonly known as: Latuda   Take 40 mg by mouth with dinner.  Dose: 40 mg      metoprolol SR 50 MG Tb24  Commonly known as: Toprol XL   Take 50 mg by mouth 2 times a day.  Dose: 50 mg     ondansetron 4 MG Tbdp  Commonly known as: Zofran ODT   Take 1 Tablet by mouth every 6 hours as needed.  Dose: 4 mg     traZODone 50 MG Tabs  Commonly known as: Desyrel   Take 50 mg by mouth every evening.  Dose: 50 mg              Allergies  Allergies[1]    DIET  Orders Placed This Encounter   Procedures    Diet Order Diet: Regular     Standing Status:   Standing     Number of Occurrences:   1     Diet::   Regular [1]       ACTIVITY  As tolerated.      CONSULTATIONS  N/A    PROCEDURES  N/A    LABORATORY  Lab Results   Component Value Date    SODIUM 137 06/03/2025    POTASSIUM 3.5 (L) 06/03/2025    CHLORIDE 105 06/03/2025    CO2 24 06/03/2025    GLUCOSE 99 06/03/2025    BUN 8 06/03/2025    CREATININE 0.65 06/03/2025        Lab Results   Component Value Date    WBC 8.7 06/03/2025    HEMOGLOBIN 12.9 06/03/2025    HEMATOCRIT 37.5 06/03/2025    PLATELETCT 297 06/03/2025        Total time of the discharge process exceeds 32 minutes.         [1] No Known Allergies